# Patient Record
(demographics unavailable — no encounter records)

---

## 2024-10-11 NOTE — HISTORY OF PRESENT ILLNESS
[de-identified] : pt here follow up for excess gas getting progressively worse, pt also complains of sore throat, afebrile  [FreeTextEntry6] : Sore throat on and off this week Gassy and passing a lot of gas all day Not the healthier diet Dad has UC No fever No ear pain, No nasal congestion, no sore throat No cough, No wheezing Normal appetite, No vomiting, No diarrhea

## 2024-10-11 NOTE — HISTORY OF PRESENT ILLNESS
[de-identified] : pt here follow up for excess gas getting progressively worse, pt also complains of sore throat, afebrile  [FreeTextEntry6] : Sore throat on and off this week Gassy and passing a lot of gas all day Not the healthier diet Dad has UC No fever No ear pain, No nasal congestion, no sore throat No cough, No wheezing Normal appetite, No vomiting, No diarrhea

## 2024-10-11 NOTE — PHYSICAL EXAM
[Erythematous Oropharynx] : erythematous oropharynx [NL] : warm, clear [Vesicles] : no vesicles [Exudate] : no exudate [Ulcerative Lesions] : no ulcerative lesions [Palate petechiae] : palate without petechiae [Tenderness with Palpation] : no tenderness with palpation [Mass] : no mass palpable [McBurney's point tenderness] : no McBurney's point tenderness [Rebound tenderness] : no rebound tenderness

## 2024-10-11 NOTE — DISCUSSION/SUMMARY
[FreeTextEntry1] : Lactose and dairy free diet Probiotics Simethicone for gas Avoid junk food Recheck 1 month, earlier if worse, consider BW and/or GI

## 2024-12-14 NOTE — PHYSICAL EXAM
[Tired appearing] : not tired appearing [Lethargic] : not lethargic [Toxic] : not toxic [Erythema] : no erythema [Transmitted Upper Airway Sounds] : no transmitted upper airway sounds [Tachypnea] : no tachypnea [NL] : warm, clear [FreeTextEntry7] : bilateral lower scant crackles, no rhonchi, no wheeze

## 2024-12-14 NOTE — REVIEW OF SYSTEMS
[Nasal Congestion] : nasal congestion [Sore Throat] : sore throat [Tachypnea] : not tachypneic [Wheezing] : no wheezing [Cough] : cough [Congestion] : congestion [Shortness of Breath] : no shortness of breath [Negative] : Genitourinary

## 2024-12-14 NOTE — PLAN
[TextEntry] : Take antibiotics as prescribed Monitor for respiratory distress such as retractions, wheezing, color changes, rapid and irregular breathing patterns, stridor, or change of mental status -- if any of these symptoms/signs occur go directly to ER.  If child has fever/discomfort you can give Ibuprofen (>6months old) or Acetaminophen as per weight. Continue to encourage fluids to prevent dehydration. Encourage rest and decrease physical exertions. Can utilize nasal suctioning if needed.   Return to office in 5-7 days to check lungs or sooner with new or worsening symptoms.

## 2024-12-14 NOTE — HISTORY OF PRESENT ILLNESS
[de-identified] : Coughing and runny nose for a week. Throat now hurts a little. Afebrile [FreeTextEntry6] : Cough, congestion, for about 1 week, no fever slight sore throat, no respiratory distress, no wheezing or dyspnea. No rashes, no lethargy, no myalgia. No abdominal pain, no vomiting or diarrhea, stooling normally. Voiding normally, eating and drinking well. No concern for dehydration.   mom also sick No recent travel. No other concerns at this time

## 2024-12-23 NOTE — PLAN
[TextEntry] : saline nasal mist nightly warm mist humidifier  symptomatic cough management, counseled on lingering cough is normal, as long as not worsening and no fever   Return for new or worsening symptoms

## 2024-12-23 NOTE — HISTORY OF PRESENT ILLNESS
[de-identified] : Recheck lungs [FreeTextEntry6] : 14 year boy here for follow up of pna, doing well  He completed antibiotic course. He has no ear pain, mild congestion, mild cough still  He has no fever. He has no difficulty with sleep. Eating and drinking well. Parents have no concerns at this time.

## 2024-12-23 NOTE — HISTORY OF PRESENT ILLNESS
[de-identified] : Recheck lungs [FreeTextEntry6] : 14 year boy here for follow up of pna, doing well  He completed antibiotic course. He has no ear pain, mild congestion, mild cough still  He has no fever. He has no difficulty with sleep. Eating and drinking well. Parents have no concerns at this time.

## 2024-12-23 NOTE — REVIEW OF SYSTEMS
[Nasal Congestion] : nasal congestion [Cough] : cough [Congestion] : congestion [Negative] : Genitourinary [Tachypnea] : not tachypneic [Wheezing] : no wheezing [Shortness of Breath] : no shortness of breath

## 2025-01-08 NOTE — REVIEW OF SYSTEMS
[Fever] : fever [Chills] : no chills [Malaise] : malaise [Headache] : headache [Ear Pain] : no ear pain [Nasal Congestion] : nasal congestion [Sore Throat] : no sore throat [Tachypnea] : not tachypneic [Wheezing] : no wheezing [Cough] : cough [Congestion] : congestion [Shortness of Breath] : no shortness of breath [Negative] : Genitourinary

## 2025-01-08 NOTE — PLAN
[TextEntry] : + FLU Rapid COVID, & Strep NEGATIVE. Will send out throat culture, if POSITIVE will begin AMOXICILLIN (400mg/5mL) 6 ML TWICE DAILY FOR 10 DAY  Symptomatic treatment. Maintain adequate hydration & rest. Warm Mist humidifier in room.  Nasal suctioning PRN if applicable Any fever >5 days, return to office. Stressed hand washing and infection control Pay close observation for new or worsening symptoms. Manage discomfort/fever as needed with OTC Acetaminophen and Ibuprofen (only if older than 6 months)   Instructed to return to office if condition worsens or new symptoms arise.

## 2025-01-08 NOTE — PHYSICAL EXAM
[Tired appearing] : not tired appearing [Lethargic] : not lethargic [Toxic] : not toxic [Erythema] : no erythema [Wheezing] : no wheezing [Rales] : no rales [Crackles] : no crackles [Rhonchi] : no rhonchi [NL] : warm, clear

## 2025-01-08 NOTE — HISTORY OF PRESENT ILLNESS
[de-identified] : fever. congestion, and cough all x 1 day  [FreeTextEntry6] : malaise and headache since Monday, fever today, with cough and congestion  NO sore throat, no respiratory distress, no wheezing or dyspnea. No rashes, no lethargy, no myalgia. No abdominal pain, no vomiting or diarrhea, stooling normally. Voiding normally, eating and drinking well. No concern for dehydration.   No sick contacts. No recent travel. No other concerns at this time

## 2025-05-10 NOTE — HISTORY OF PRESENT ILLNESS
[de-identified] : lump on right arm [FreeTextEntry6] : Lump on right arm x 3-5 days, not sure about timing No h/o trauma or injury Plays tennis Hurts to touch if pressing hard No bruising, redness or discahrge

## 2025-05-10 NOTE — PHYSICAL EXAM
[NL] : moves all extremities x4, warm, well perfused x4 [de-identified] : 2x2 firm movable mass on dorsum of right forearm, no redness, no bruising, slightly tender to pressure

## 2025-05-10 NOTE — DISCUSSION/SUMMARY
[FreeTextEntry1] : Observe for now Discussed imaging and radiation risks vs benefits, will hold off for now, dad agrees with plan Avoid touching area or causing trauma Ibuprofen for pain and swelling Maintain adequate hydration  Stressed handwashing and infection control  Pay close observation for new or worsening symptoms Instructed to return to office if condition worsens or new symptoms arise Go to ER or UC if condition worsens or unable to to get to the office or after office hours Recheck 2-3 weeks, earlier if changes or pain

## 2025-05-20 NOTE — HISTORY OF PRESENT ILLNESS
[de-identified] : Fever, sore throat, congestion [FreeTextEntry6] : Fever x 1 day Sore throat x 1 day HA, body aches and fatigue x 1 day No ear pain No cough, wheezing or dyspnea Slight nasal congestion Normal appetite, No vomiting, No diarrhea No smell or taste issues No recent sick contacts No recent Covid contacts or exposure No recent travel or contact with travelers

## 2025-05-20 NOTE — PHYSICAL EXAM
[NL] : warm, clear [Acute Distress] : no acute distress [Tired appearing] : tired appearing [Conjuctival Injection] : no conjunctival injection [Discharge] : no discharge [Erythema] : no erythema [Bulging] : not bulging [Clear Rhinorrhea] : clear rhinorrhea [Erythematous Oropharynx] : erythematous oropharynx [Vesicles] : no vesicles [Exudate] : no exudate [Ulcerative Lesions] : no ulcerative lesions [Palate petechiae] : palate without petechiae [Wheezing] : no wheezing [Rales] : no rales [Crackles] : no crackles [Transmitted Upper Airway Sounds] : no transmitted upper airway sounds [Tachypnea] : no tachypnea [Rhonchi] : no rhonchi [Enlarged] : enlarged [Submandibular] : submandibular [Warm, Well Perfused x4] : warm, well perfused x4

## 2025-05-20 NOTE — DISCUSSION/SUMMARY
English [FreeTextEntry1] : Symptomatic treatment of fever and/or pain discussed Stat strep test ordered Throat culture, if POSITIVE, give Amoxicillin 500 mg BID x 10 days Covid test done Flu test done Hydrate well Handwashing and infection control discussed Return to office if febrile > 48 hours or if symptoms get worse Go to ER if unable to come to the office or during after hours, parent encouraged to call service first before doing so. Recheck prn

## 2025-06-03 NOTE — DISCUSSION/SUMMARY
[Normal Growth] : growth [Normal Development] : development  [No Elimination Concerns] : elimination [Continue Regimen] : feeding [No Skin Concerns] : skin [Normal Sleep Pattern] : sleep [None] : no medical problems [Anticipatory Guidance Given] : Anticipatory guidance addressed as per the history of present illness section [Physical Growth and Development] : physical growth and development [Social and Academic Competence] : social and academic competence [Emotional Well-Being] : emotional well-being [Risk Reduction] : risk reduction [Violence and Injury Prevention] : violence and injury prevention [No Vaccines] : no vaccines needed [No Medications] : ~He/She~ is not on any medications [Patient] : patient [Parent/Guardian] : Parent/Guardian [Full Activity without restrictions including Physical Education & Athletics] : Full Activity without restrictions including Physical Education & Athletics [FreeTextEntry1] : Continue balanced diet with all food groups. Brush teeth twice a day with toothbrush. Recommend visit to dentist. Maintain consistent daily routines and sleep schedule. Personal hygiene, puberty, and sexual health reviewed. Risky behaviors assessed. School discussed. Limit screen time to no more than 2 hours per day. Encourage physical activity. Cardiac questionnaire reviewed, NO issues. 5-2-1-0 questionnaire reviewed and I discussed components of 5-2-1-0 healthy living with patient and family.  Recommended 5 servings of fruits and vegetables a day, less than 2 hours of screen time per day, 1 hour of exercise per day and zero sugar sweetened beverages. Parent(s) have no issues or concerns. Discussed in the preferred language of English Return 1 year for routine well child check. Gardasil vaccination not carried out due to parent refusal .  I spent adequate time to explain the pros and cons of giving and not giving the vaccines.  Vaccine info sheets were given to parent(s) for reference.  Parent(s) are fully aware of the risks and consequences of refusing to immunize the patient and accept them. Follow up with  as needed

## 2025-06-03 NOTE — PHYSICAL EXAM

## 2025-06-03 NOTE — HISTORY OF PRESENT ILLNESS
[Mother] : mother [Yes] : Patient goes to dentist yearly [Toothpaste] : Primary Fluoride Source: Toothpaste [HPV] : HPV [No] : Patient has not had sexual intercourse [HIV Screening Declined] : HIV Screening Declined [Has ways to cope with stress] : has ways to cope with stress [Displays self-confidence] : does not display self-confidence [Has problems with sleep] : has problems with sleep [Gets depressed, anxious, or irritable/has mood swings] : gets depressed, anxious, or irritable/has mood swings [Has thought about hurting self or considered suicide] : has not thought about hurting self or considered suicide [FreeTextEntry7] : 15 y/o [facility] [NO] : No [FreeTextEntry1] : Patient is doing well - has no current concerns or issues. Parent(s) have no current concerns or issues. No change in health status since last WCC No chest pains or difficulty of breathing reported No reactions to previous vaccinations. Sees Dr Littlejohn for OCD, on prozac No suicidal ideations Appetite good, no issues No new allergies reported Not sexually active Denies tobacco, ETOH, drug use or vaping No tobacco exposure Sleeping well with good sleeping patterns No recent severe illness or injury No emergency room visits No trauma to the head /concussion. Current [id number] No problems in school identified -  no ADD/ADHD concerns Activities: no sports PHQ9 and CRAFFT reviewed with patient Coordination of Care reviewed, no issues Cardiac questionnaire reviewed, no issues Has been to the dentist within last 12 months